# Patient Record
Sex: FEMALE | Race: WHITE | URBAN - METROPOLITAN AREA
[De-identification: names, ages, dates, MRNs, and addresses within clinical notes are randomized per-mention and may not be internally consistent; named-entity substitution may affect disease eponyms.]

---

## 2022-05-19 LAB
BACTERIA: ABNORMAL /HPF
BILIRUBIN URINE: NEGATIVE
BLOOD, URINE: NEGATIVE
CLARITY: CLEAR
COLOR: YELLOW
EPITHELIAL CELLS, UA: ABNORMAL /HPF (ref 0–5)
GLUCOSE URINE: NEGATIVE MG/DL
HYALINE CASTS: ABNORMAL /HPF (ref 0–5)
KETONES, URINE: NEGATIVE MG/DL
LEUKOCYTE ESTERASE, URINE: ABNORMAL
NITRITE, URINE: NEGATIVE
PH UA: 7 (ref 5–9)
PROTEIN UA: 30 MG/DL
RBC UA: ABNORMAL /HPF (ref 0–5)
SPECIFIC GRAVITY UA: 1.02 (ref 1–1.03)
UROBILINOGEN, URINE: 0.2 E.U./DL
WBC UA: ABNORMAL /HPF (ref 0–5)

## 2022-05-20 LAB — URINE CULTURE, ROUTINE: NORMAL

## 2022-07-29 ENCOUNTER — OFFICE VISIT (OUTPATIENT)
Dept: GERIATRIC MEDICINE | Age: 82
End: 2022-07-29
Payer: MEDICARE

## 2022-07-29 DIAGNOSIS — E22.2 SIADH (SYNDROME OF INAPPROPRIATE ADH PRODUCTION) (HCC): ICD-10-CM

## 2022-07-29 DIAGNOSIS — I25.10 CORONARY ARTERY DISEASE INVOLVING NATIVE HEART, UNSPECIFIED VESSEL OR LESION TYPE, UNSPECIFIED WHETHER ANGINA PRESENT: ICD-10-CM

## 2022-07-29 DIAGNOSIS — D32.9 MENINGIOMA (HCC): ICD-10-CM

## 2022-07-29 DIAGNOSIS — K21.9 GASTROESOPHAGEAL REFLUX DISEASE, UNSPECIFIED WHETHER ESOPHAGITIS PRESENT: ICD-10-CM

## 2022-07-29 DIAGNOSIS — J45.909 ASTHMA, UNSPECIFIED ASTHMA SEVERITY, UNSPECIFIED WHETHER COMPLICATED, UNSPECIFIED WHETHER PERSISTENT: ICD-10-CM

## 2022-07-29 DIAGNOSIS — N18.9 CHRONIC KIDNEY DISEASE, UNSPECIFIED CKD STAGE: ICD-10-CM

## 2022-07-29 DIAGNOSIS — F01.50 VASCULAR DEMENTIA WITHOUT BEHAVIORAL DISTURBANCE (HCC): ICD-10-CM

## 2022-07-29 DIAGNOSIS — I10 PRIMARY HYPERTENSION: ICD-10-CM

## 2022-07-29 DIAGNOSIS — R56.9 SEIZURES (HCC): Primary | ICD-10-CM

## 2022-07-29 DIAGNOSIS — E21.3 HYPERPARATHYROIDISM (HCC): ICD-10-CM

## 2022-07-29 DIAGNOSIS — M81.0 OSTEOPOROSIS WITHOUT CURRENT PATHOLOGICAL FRACTURE, UNSPECIFIED OSTEOPOROSIS TYPE: ICD-10-CM

## 2022-07-29 PROCEDURE — 1123F ACP DISCUSS/DSCN MKR DOCD: CPT | Performed by: NURSE PRACTITIONER

## 2022-07-29 PROCEDURE — G8421 BMI NOT CALCULATED: HCPCS | Performed by: NURSE PRACTITIONER

## 2022-07-29 PROCEDURE — 1090F PRES/ABSN URINE INCON ASSESS: CPT | Performed by: NURSE PRACTITIONER

## 2022-08-02 LAB
ALBUMIN SERPL-MCNC: 4.1 G/DL (ref 3.5–4.6)
ALP BLD-CCNC: 116 U/L (ref 40–130)
ALT SERPL-CCNC: 12 U/L (ref 0–33)
ANION GAP SERPL CALCULATED.3IONS-SCNC: 13 MEQ/L (ref 9–15)
AST SERPL-CCNC: 15 U/L (ref 0–35)
BASOPHILS ABSOLUTE: 0 K/UL (ref 0–0.2)
BASOPHILS RELATIVE PERCENT: 0.4 %
BILIRUB SERPL-MCNC: 0.5 MG/DL (ref 0.2–0.7)
BUN BLDV-MCNC: 16 MG/DL (ref 8–23)
CALCIUM SERPL-MCNC: 9.4 MG/DL (ref 8.5–9.9)
CHLORIDE BLD-SCNC: 103 MEQ/L (ref 95–107)
CO2: 25 MEQ/L (ref 20–31)
CREAT SERPL-MCNC: 1.21 MG/DL (ref 0.5–0.9)
EOSINOPHILS ABSOLUTE: 0 K/UL (ref 0–0.7)
EOSINOPHILS RELATIVE PERCENT: 0.8 %
GFR AFRICAN AMERICAN: 51.6
GFR NON-AFRICAN AMERICAN: 42.7
GLOBULIN: 2.8 G/DL (ref 2.3–3.5)
GLUCOSE BLD-MCNC: 128 MG/DL (ref 70–99)
HCT VFR BLD CALC: 32.8 % (ref 37–47)
HEMOGLOBIN: 10.9 G/DL (ref 12–16)
LYMPHOCYTES ABSOLUTE: 1.1 K/UL (ref 1–4.8)
LYMPHOCYTES RELATIVE PERCENT: 20.7 %
MAGNESIUM: 2.1 MG/DL (ref 1.7–2.4)
MCH RBC QN AUTO: 33 PG (ref 27–31.3)
MCHC RBC AUTO-ENTMCNC: 33.3 % (ref 33–37)
MCV RBC AUTO: 99.3 FL (ref 82–100)
MONOCYTES ABSOLUTE: 0.3 K/UL (ref 0.2–0.8)
MONOCYTES RELATIVE PERCENT: 4.8 %
NEUTROPHILS ABSOLUTE: 3.9 K/UL (ref 1.4–6.5)
NEUTROPHILS RELATIVE PERCENT: 73.3 %
PDW BLD-RTO: 13.3 % (ref 11.5–14.5)
PLATELET # BLD: 187 K/UL (ref 130–400)
POTASSIUM SERPL-SCNC: 4.1 MEQ/L (ref 3.4–4.9)
RBC # BLD: 3.3 M/UL (ref 4.2–5.4)
SODIUM BLD-SCNC: 141 MEQ/L (ref 135–144)
TOTAL PROTEIN: 6.9 G/DL (ref 6.3–8)
WBC # BLD: 5.3 K/UL (ref 4.8–10.8)

## 2022-08-04 LAB — VITAMIN D 1,25-DIHYDROXY: 42.1 PG/ML (ref 19.9–79.3)

## 2022-08-13 LAB
BACTERIA: NEGATIVE /HPF
BILIRUBIN URINE: NEGATIVE
BLOOD, URINE: NEGATIVE
CLARITY: CLEAR
COLOR: YELLOW
EPITHELIAL CELLS, UA: ABNORMAL /HPF (ref 0–5)
GLUCOSE URINE: NEGATIVE MG/DL
HYALINE CASTS: ABNORMAL /HPF (ref 0–5)
KETONES, URINE: ABNORMAL MG/DL
LEUKOCYTE ESTERASE, URINE: ABNORMAL
NITRITE, URINE: NEGATIVE
PH UA: 5.5 (ref 5–9)
PROTEIN UA: 30 MG/DL
RBC UA: ABNORMAL /HPF (ref 0–5)
SPECIFIC GRAVITY UA: 1.03 (ref 1–1.03)
UROBILINOGEN, URINE: 1 E.U./DL
WBC UA: >100 /HPF (ref 0–5)

## 2022-08-15 LAB — URINE CULTURE, ROUTINE: NORMAL

## 2022-08-17 LAB
BACTERIA: ABNORMAL /HPF
BILIRUBIN URINE: NEGATIVE
BLOOD, URINE: NEGATIVE
CLARITY: CLEAR
COLOR: YELLOW
GLUCOSE URINE: NEGATIVE MG/DL
HYALINE CASTS: ABNORMAL /LPF (ref 0–5)
KETONES, URINE: ABNORMAL MG/DL
LEUKOCYTE ESTERASE, URINE: ABNORMAL
NITRITE, URINE: NEGATIVE
PH UA: 6 (ref 5–9)
PROTEIN UA: 30 MG/DL
RBC UA: ABNORMAL /HPF (ref 0–2)
SPECIFIC GRAVITY UA: 1.03 (ref 1–1.03)
UROBILINOGEN, URINE: 0.2 E.U./DL
WBC UA: ABNORMAL /HPF (ref 0–5)

## 2022-08-18 LAB — URINE CULTURE, ROUTINE: NORMAL

## 2022-08-24 PROBLEM — I25.10 CORONARY ARTERY DISEASE INVOLVING NATIVE HEART: Status: ACTIVE | Noted: 2022-08-24

## 2022-08-24 PROBLEM — N18.9 CHRONIC KIDNEY DISEASE: Status: ACTIVE | Noted: 2022-08-24

## 2022-08-24 PROBLEM — F01.50 VASCULAR DEMENTIA WITHOUT BEHAVIORAL DISTURBANCE (HCC): Status: ACTIVE | Noted: 2022-08-24

## 2022-08-25 NOTE — PROGRESS NOTES
Nursing Home:  [facility]    The patient is being seen today for follow-up after recent admission to this facility for:  Chief Complaint   Patient presents with    Follow-up     New Admission to our service in [place]         SUBJECTIVE: Being seen today after recent admission to our service in the assisted living at this facility with primary diagnosis of seizures, SIADH, meningioma, hyperparathyroidism, vascular dementia without behavioral disturbance, CKD, primary hypertension, asthma, GERD, osteoporosis without current pathological fracture, and CAD. FAMILY AND SOCIAL HISTORY:  Refer to H&P. Past Medical History:   Diagnosis Date    Asthma     Basal cell carcinoma     C. difficile colitis     E. coli and proteus  UTI 3/18/2016    GERD (gastroesophageal reflux disease)     HTN (hypertension)     Hyperparathyroidism ([facility]     Osteoporosis     Seizures (HCC)     Shingles     SIADH (syndrome of inappropriate ADH production) [facility]      No family history on file. Social History     Socioeconomic History    Marital status:      Spouse name: Not on file    Number of children: Not on file    Years of education: Not on file    Highest education level: Not on file   Occupational History    Not on file   Tobacco Use    Smoking status: Not on file    Smokeless tobacco: Not on file   Substance and Sexual Activity    Alcohol use: Not on file    Drug use: Not on file    Sexual activity: Not on file   Other Topics Concern    Not on file   Social History Narrative    Not on file     Social Determinants of Health     Financial Resource Strain: Not on file   Food Insecurity: Not on file   Transportation Needs: Not on file   Physical Activity: Not on file   Stress: Not on file   Social Connections: Not on file   Intimate Partner Violence: Not on file   Housing Stability: Not on file     ALLERGIES:  Patient has no known allergies.     MEDICATIONS: Butyryl sulfate HFA aerosol solution 108 (90 base MCG/ACT 2 puff inhalation orally every 6 as needed, ASA 81 mg tab p.o. daily, atorvastatin calcium 20 mg tab p.o. daily,Fluticasone-Salmeterol Aerosol Powder Breath Activated 25-50 mcg/act one inhalation orally two times a day, lamotrigine 200mg tablet give one tablet by mouth twice daily, levtiracetam 1000mg tablet give one tablet by mouth daily, levetiracetam 750mg tablet by mouth daily, magnesium oxide 400mg tablet give one tablet p.o. daily, midodrine 2.5mg tablet p.o two times a day, montelukast sodium 10mg tablet give one tablet by mouth daily,mucinex ER 600mg tablet p.o. twice a day. REVIEW OF SYSTEMS:  Resident denies any headache, dizziness, blurred vision. She denies any fever, chills, sore throat. She denies any rashes, bruises, or open areas. She denies any chest pain, chest discomfort, or heart palpitations. She denies any shortness of breath or cough. She denies any nausea, vomiting, or abdominal pain. She denies any urinary urgency, frequency, or dysuria. She denies any constipation or diarrhea. She states she is eating okay, sleeping okay, and she currently has no pain. She is requesting mucinex for occasional congestion. PHYSICAL EXAMINATION:  Most recent vital signs: Blood pressure 124/68, temp 97.5, heart rate 75, respirations 20, pulse ox 98% room air, weight 133.9 pounds. Constitutional:  Resident is a 81 y.o. female alert, pleasantly confused, and cooperative with exam.  In no apparent distress. Integument:  Pink, warm, dry. No visible rashes, bruises, or open areas. HEENT:  Normocephalic, atraumatic. External ears appear to be within normal limits. Conjunctivae pink. Sclerae nonicteric. Mucous membranes pink, moist.  No oropharyngeal exudate. Neck:  Supple. No cervical or clavicular lymphadenopathy. No masses noted. Cardiovascular:  Heart rate regular rate and rhythm. No murmurs, gallops, rubs noted. Respiratory:  Lung sounds Normal.  Respirations nonlabored.   The patient is not using accessory muscles with breathing. Current pulse ox 98% room air. Abdomen:  Soft, nontender, nondistended, normoactive bowel sounds. No masses noted. Musculoskeletal: No muscle tenderness. No joint tenderness. PV:  Peripheral pulses present. She  does not have any edema to BLE. Psychological: Mood stable. Affect pleasantly confused. Speech normal rate and tone. ASSESSMENT AND PLAN:      Diagnosis Orders   1. Seizures ([facility]        2. SIADH (syndrome of inappropriate ADH production) ([facility]        3. Meningioma ([address]        4. Hyperparathyroidism ([facility] [facility]        5. Vascular dementia without behavioral disturbance ([address]        6. Chronic kidney disease, unspecified CKD stage        7. Primary hypertension        8. Asthma, unspecified asthma severity, unspecified whether complicated, unspecified whether persistent        9. Gastroesophageal reflux disease, unspecified whether esophagitis present        10. Osteoporosis without current pathological fracture, unspecified osteoporosis type        11. Coronary artery disease involving native heart, unspecified vessel or lesion type, unspecified whether angina present             She has not had any seizure activity. Continue current antiepileptic drug regimen. Most recent sodium level 140. Denies N/V,cramps, tremors, increased confusion, mood changes or depression. Patient had resected 3/17/2016. No serum PTH found in computer system. Most recent calcium 9.4. Will order serum PTH during next visit. Patient pleasantly confused. DON reports patient may [facility]. Most recent BUN 16, creatinine 1. 19. CMP in am.  BP stable. Patient has no blood pressure medications. Currently on Midodrine twice a day for pressure support. Respiratory status stable. Will add mucinex 600mg ER tablet BID for patient reports of occasional congestion. Tolerating prescribed diet.   No current complaints of pain.  No chest pain, chest discomfort or heart palpitations that patient is aware of. Continue ASA and MagOx as ordered. I have reviewed this resident's medication and treatment plan as well as most recent lab work. CMP,Mg+ level, CBC with diff, Vitamin D level. Mucinex 600mg po q12h. PT/OT/ST as needed. No further changes will be made at this time. Return in about 3 months (around 10/29/2022), or if symptoms worsen or fail to improve, for chronic conditions. Please note this report is partially produced by using speech recognition hardware. It may contain errors related to the system, including grammar, punctuation and spelling as well as words and phrases that may seem inaccurate. For any questions or concerns feel free to contact me for clarification        Electronically Signed By: Daksha Jacob. Andi MUELLER, MARYAN on 8/24/22       ________________________    Daksha Jacob.  Joanna MUELLER, [address]  Office [phone]  Fax [phone]

## 2022-09-05 LAB
ANION GAP SERPL CALCULATED.3IONS-SCNC: 9 MEQ/L (ref 9–15)
BUN BLDV-MCNC: 20 MG/DL (ref 8–23)
CALCIUM SERPL-MCNC: 9.1 MG/DL (ref 8.5–9.9)
CHLORIDE BLD-SCNC: 106 MEQ/L (ref 95–107)
CO2: 25 MEQ/L (ref 20–31)
CREAT SERPL-MCNC: 1.3 MG/DL (ref 0.5–0.9)
GFR AFRICAN AMERICAN: 47.5
GFR NON-AFRICAN AMERICAN: 39.3
GLUCOSE BLD-MCNC: 97 MG/DL (ref 70–99)
HCT VFR BLD CALC: 28.1 % (ref 37–47)
HEMOGLOBIN: 9.9 G/DL (ref 12–16)
MCH RBC QN AUTO: 34.5 PG (ref 27–31.3)
MCHC RBC AUTO-ENTMCNC: 35.2 % (ref 33–37)
MCV RBC AUTO: 98 FL (ref 82–100)
PDW BLD-RTO: 13 % (ref 11.5–14.5)
PLATELET # BLD: 185 K/UL (ref 130–400)
POTASSIUM SERPL-SCNC: 4.5 MEQ/L (ref 3.4–4.9)
RBC # BLD: 2.87 M/UL (ref 4.2–5.4)
SODIUM BLD-SCNC: 140 MEQ/L (ref 135–144)
WBC # BLD: 5.1 K/UL (ref 4.8–10.8)

## 2022-09-08 PROBLEM — I95.9 HYPOTENSION: Status: ACTIVE | Noted: 2022-09-08

## 2022-09-12 RX ORDER — ATORVASTATIN CALCIUM 20 MG/1
20 TABLET, FILM COATED ORAL DAILY
Qty: 30 TABLET | Refills: 3 | Status: SHIPPED | OUTPATIENT
Start: 2022-09-12

## 2022-09-12 RX ORDER — ATORVASTATIN CALCIUM 20 MG/1
20 TABLET, FILM COATED ORAL DAILY
COMMUNITY
End: 2022-09-12 | Stop reason: SDUPTHER

## 2022-09-12 RX ORDER — FLUTICASONE PROPIONATE AND SALMETEROL 250; 50 UG/1; UG/1
1 POWDER RESPIRATORY (INHALATION) 2 TIMES DAILY
Qty: 60 EACH | Refills: 3 | Status: SHIPPED | OUTPATIENT
Start: 2022-09-12

## 2022-09-12 RX ORDER — FLUTICASONE PROPIONATE AND SALMETEROL 250; 50 UG/1; UG/1
1 POWDER RESPIRATORY (INHALATION) 2 TIMES DAILY
COMMUNITY
End: 2022-09-12 | Stop reason: SDUPTHER

## 2022-09-27 ENCOUNTER — OFFICE VISIT (OUTPATIENT)
Dept: GERIATRIC MEDICINE | Age: 82
End: 2022-09-27
Payer: MEDICARE
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
## 2022-09-27 DIAGNOSIS — D32.9 MENINGIOMA (HCC): ICD-10-CM

## 2022-09-27 DIAGNOSIS — N18.9 CHRONIC KIDNEY DISEASE, UNSPECIFIED CKD STAGE: ICD-10-CM

## 2022-09-27 DIAGNOSIS — F01.50 VASCULAR DEMENTIA WITHOUT BEHAVIORAL DISTURBANCE (HCC): ICD-10-CM

## 2022-09-27 DIAGNOSIS — I25.10 CORONARY ARTERY DISEASE INVOLVING NATIVE HEART, UNSPECIFIED VESSEL OR LESION TYPE, UNSPECIFIED WHETHER ANGINA PRESENT: ICD-10-CM

## 2022-09-27 DIAGNOSIS — K21.9 GASTROESOPHAGEAL REFLUX DISEASE, UNSPECIFIED WHETHER ESOPHAGITIS PRESENT: ICD-10-CM

## 2022-09-27 DIAGNOSIS — E22.2 SIADH (SYNDROME OF INAPPROPRIATE ADH PRODUCTION) (HCC): ICD-10-CM

## 2022-09-27 DIAGNOSIS — R56.9 SEIZURES (HCC): ICD-10-CM

## 2022-09-27 DIAGNOSIS — E21.3 HYPERPARATHYROIDISM (HCC): Primary | ICD-10-CM

## 2022-09-27 DIAGNOSIS — M81.0 OSTEOPOROSIS WITHOUT CURRENT PATHOLOGICAL FRACTURE, UNSPECIFIED OSTEOPOROSIS TYPE: ICD-10-CM

## 2022-09-27 DIAGNOSIS — J45.909 UNCOMPLICATED ASTHMA, UNSPECIFIED ASTHMA SEVERITY, UNSPECIFIED WHETHER PERSISTENT: ICD-10-CM

## 2022-09-27 DIAGNOSIS — I10 PRIMARY HYPERTENSION: ICD-10-CM

## 2022-09-27 PROCEDURE — 1090F PRES/ABSN URINE INCON ASSESS: CPT | Performed by: NURSE PRACTITIONER

## 2022-09-27 PROCEDURE — 1123F ACP DISCUSS/DSCN MKR DOCD: CPT | Performed by: NURSE PRACTITIONER

## 2022-09-27 PROCEDURE — G8421 BMI NOT CALCULATED: HCPCS | Performed by: NURSE PRACTITIONER

## 2022-10-18 NOTE — PROGRESS NOTES
Nursing Home:  [address] [place] [address]    The patient is being seen today for follow-up after recent admission to this facility for:  Chief Complaint   Patient presents with    Follow-up         SUBJECTIVE:  Patient being seen today for follow-up after recent transfer from standard Assisted Living to the memory care assisted living. Patient reports she is doing well and offers no concerns. She reports she does miss her  who is currently in healthcare but he does come and visit frequently. FAMILY AND SOCIAL HISTORY:  Refer to H&P. Past Medical History:   Diagnosis Date    Asthma     Basal cell carcinoma     C. difficile colitis     E. coli and proteus  UTI 3/18/2016    GERD (gastroesophageal reflux disease)     HTN (hypertension)     Hyperparathyroidism ([facility] [address]     Osteoporosis     Seizures (HCC)     Shingles     SIADH (syndrome of inappropriate ADH production) ([facility]      No family history on file. Social History     Socioeconomic History    Marital status:      Spouse name: Not on file    Number of children: Not on file    Years of education: Not on file    Highest education level: Not on file   Occupational History    Not on file   Tobacco Use    Smoking status: Not on file    Smokeless tobacco: Not on file   Substance and Sexual Activity    Alcohol use: Not on file    Drug use: Not on file    Sexual activity: Not on file   Other Topics Concern    Not on file   Social History Narrative    Not on file     Social Determinants of Health     Financial Resource Strain: Not on file   Food Insecurity: Not on file   Transportation Needs: Not on file   Physical Activity: Not on file   Stress: Not on file   Social Connections: Not on file   Intimate Partner Violence: Not on file   Housing Stability: Not on file     ALLERGIES:  Patient has no known allergies. MEDICATIONS: Albuterol sulfate HFA aerosol solution 108 (90 base MCG/ACT 2 puff inhalation orally every 6 as needed, ASA 81 mg tab p.o. daily, atorvastatin calcium 20 mg tab p.o. daily,fluticasone-salmeterol aerosol powder breath activated 250-50 MCG/ACT one inhalation orally two times a day, lamotrigine 200 mg tab p.o. twice daily, Keppra 1000 mg p.o. daily, Keppra 750 mg p.o. daily, magnesium oxide 400 mg tab p.o. daily, midodrine 5 mg tab p.o. twice daily, montelukast sodium 10 mg tab p.o. daily, Mucinex extended release 12-hour tab 600 mg tab p.o. twice daily. REVIEW OF SYSTEMS:  Resident denies any headache, dizziness, blurred vision. She denies any fever, chills, sore throat. She denies any rashes, bruises, or open areas. She denies any chest pain, chest discomfort, or heart palpitations. She denies any shortness of breath or cough. She denies any nausea, vomiting, or abdominal pain. She denies any urinary urgency, frequency, or dysuria. She denies any constipation or diarrhea. She states she is eating okay, sleeping okay, and she currently has no pain. She reports she is doing well. PHYSICAL EXAMINATION:  Most recent vital signs:  /81, temperature 97.5, heart rate 81, respirations 18, spo2 98%, weight 134lbs. Constitutional:  Resident is a 81 y.o. female alert, pleasant, and cooperative with exam.  In no apparent distress. Integument:  Pink, warm, dry. No visible rashes, bruises or open areas. HEENT:  Normocephalic, atraumatic. External ears appear to be within normal limits. [place]. Conjunctivae pink. Sclerae nonicteric. Mucous membranes pink, moist.  No oropharyngeal exudate. Neck:  Supple. No cervical or clavicular lymphadenopathy. No masses noted. Cardiovascular:  Heart rate regular rate and rhythm. No murmurs, gallops, rubs noted. Respiratory:  Lung sounds Normal.  Respirations nonlabored. The patient is not using accessory muscles with breathing. Current pulse ox 98% on room air. Abdomen:  Soft, nontender, nondistended, normoactive bowel sounds. No masses noted.   Musculoskeletal: No muscle tenderness. No joint tenderness. PV:  Peripheral pulses present. She  does not have any edema to BLE. Psychological: Mood stable. Affect pleasantly confused. Speech normal rate and tone. ASSESSMENT AND PLAN:      Diagnosis Orders   1. Hyperparathyroidism ([address]        2. SIADH (syndrome of inappropriate ADH production) ([facility])        3. Vascular dementia without behavioral disturbance ([address]        4. Meningioma ([address]        5. Seizures ([address]        6. Primary hypertension        7. Coronary artery disease involving native heart, unspecified vessel or lesion type, unspecified whether angina present        8. Uncomplicated asthma, unspecified asthma severity, unspecified whether persistent        9. Gastroesophageal reflux disease, unspecified whether esophagitis present        10. Osteoporosis without current pathological fracture, unspecified osteoporosis type        11. Chronic kidney disease, unspecified CKD stage        12. Fall, initial encounter             Patient is currently asymptomatic. Will do serum PTH during next visit. Most recent sodium level 140. Patients sodium level has been stable since 2016 in review of records in [address] [place] [address] Rd. Mood stable. Patient resides on the memory care unit of this facility where she derives benefit. Follows with neurology at [facility]. No recent seizure activity. Continue lamotrigine and keppra as ordered. Blood pressure stable. She does take Midodrine for episodes of hypotension. No episodes of chest pain/chest discomfort. Continue ASA and Magnesium as ordered. No recent asthma attacks. Continue breathing treatments. Tolerating prescribed diet. No complaints of pain. Most recent BUN 20, creatinine 1.30      I have reviewed this resident's medication and treatment plan as well as most recent lab work. PT/OT and ST as needed. No further changes will be made at this time.       Return in about 3 months (around 12/27/2022), or if symptoms worsen or fail to improve, for chronic conditions. Please note this report is partially produced by using speech recognition hardware. It may contain errors related to the system, including grammar, punctuation and spelling as well as words and phrases that may seem inaccurate. For any questions or concerns feel free to contact me for clarification        Electronically Signed By: Gianluca Isbell. Andi MUELLER CNP on 10/17/22   ______________________________  Gianluca Isbell.  Andi MUELLER, CNP

## 2022-11-29 ENCOUNTER — OFFICE VISIT (OUTPATIENT)
Dept: GERIATRIC MEDICINE | Age: 82
End: 2022-11-29
Payer: MEDICARE

## 2022-11-29 DIAGNOSIS — U07.1 COVID: Primary | ICD-10-CM

## 2022-11-29 PROCEDURE — G8484 FLU IMMUNIZE NO ADMIN: HCPCS | Performed by: NURSE PRACTITIONER

## 2022-11-29 PROCEDURE — G8421 BMI NOT CALCULATED: HCPCS | Performed by: NURSE PRACTITIONER

## 2022-11-29 PROCEDURE — 1123F ACP DISCUSS/DSCN MKR DOCD: CPT | Performed by: NURSE PRACTITIONER

## 2022-11-29 PROCEDURE — 1090F PRES/ABSN URINE INCON ASSESS: CPT | Performed by: NURSE PRACTITIONER

## 2022-12-01 LAB
ANION GAP SERPL CALCULATED.3IONS-SCNC: 8 MEQ/L (ref 9–15)
BASOPHILS ABSOLUTE: 0 K/UL (ref 0–0.2)
BASOPHILS RELATIVE PERCENT: 0.4 %
BUN BLDV-MCNC: 16 MG/DL (ref 8–23)
CALCIUM SERPL-MCNC: 9.3 MG/DL (ref 8.5–9.9)
CHLORIDE BLD-SCNC: 102 MEQ/L (ref 95–107)
CO2: 29 MEQ/L (ref 20–31)
CREAT SERPL-MCNC: 1.26 MG/DL (ref 0.5–0.9)
EOSINOPHILS ABSOLUTE: 0 K/UL (ref 0–0.7)
EOSINOPHILS RELATIVE PERCENT: 0.9 %
GFR SERPL CREATININE-BSD FRML MDRD: 42.7 ML/MIN/{1.73_M2}
GLUCOSE BLD-MCNC: 101 MG/DL (ref 70–99)
HCT VFR BLD CALC: 30.8 % (ref 37–47)
HEMOGLOBIN: 10.2 G/DL (ref 12–16)
LYMPHOCYTES ABSOLUTE: 1 K/UL (ref 1–4.8)
LYMPHOCYTES RELATIVE PERCENT: 22.8 %
MCH RBC QN AUTO: 33.1 PG (ref 27–31.3)
MCHC RBC AUTO-ENTMCNC: 33.3 % (ref 33–37)
MCV RBC AUTO: 99.5 FL (ref 79.4–94.8)
MONOCYTES ABSOLUTE: 0.3 K/UL (ref 0.2–0.8)
MONOCYTES RELATIVE PERCENT: 7 %
NEUTROPHILS ABSOLUTE: 2.9 K/UL (ref 1.4–6.5)
NEUTROPHILS RELATIVE PERCENT: 68.9 %
PDW BLD-RTO: 12.4 % (ref 11.5–14.5)
PLATELET # BLD: 211 K/UL (ref 130–400)
POTASSIUM SERPL-SCNC: 3.8 MEQ/L (ref 3.4–4.9)
RBC # BLD: 3.09 M/UL (ref 4.2–5.4)
SODIUM BLD-SCNC: 139 MEQ/L (ref 135–144)
WBC # BLD: 4.2 K/UL (ref 4.8–10.8)

## 2022-12-01 RX ORDER — NIRMATRELVIR AND RITONAVIR 150-100 MG
KIT ORAL
Qty: 20 TABLET | Refills: 0 | Status: SHIPPED | OUTPATIENT
Start: 2022-12-01 | End: 2022-12-06

## 2022-12-20 ENCOUNTER — NURSE ONLY (OUTPATIENT)
Dept: GERIATRIC MEDICINE | Age: 82
End: 2022-12-20
Payer: MEDICARE

## 2022-12-20 DIAGNOSIS — F01.50 VASCULAR DEMENTIA WITHOUT BEHAVIORAL DISTURBANCE (HCC): ICD-10-CM

## 2022-12-20 DIAGNOSIS — Z74.09 IMPAIRED MOBILITY AND ACTIVITIES OF DAILY LIVING: Primary | ICD-10-CM

## 2022-12-20 DIAGNOSIS — R56.9 SEIZURES (HCC): ICD-10-CM

## 2022-12-20 DIAGNOSIS — Z78.9 IMPAIRED MOBILITY AND ACTIVITIES OF DAILY LIVING: Primary | ICD-10-CM

## 2022-12-20 PROBLEM — U07.1 COVID: Status: ACTIVE | Noted: 2022-12-20

## 2022-12-20 PROCEDURE — G9692 HOSP RECD BY PT DUR MSMT PER: HCPCS | Performed by: INTERNAL MEDICINE

## 2022-12-20 NOTE — PROGRESS NOTES
Subjective: [facility]  CC: COVID-19    HPI:  Fabiola Fuentes is a 81 y.o. female was seen today for COVID 19 evaluation. Patient has been COVID 19 positive since 11/29/22. They were seen with full PPE and observing continued droplet precautions. Condition discussed with nursing staff and treatment reviewed. Recent bloodwork, chest x-ray and vital signs reviewed. Fever curve and oxygen demands reviewed. Nutritional status and intake reviewed. Patient is demonstrating increased respiratory complaints at this time. Patient has not been febrile in the last 24 hours. Patient is able to feed themselves. Patient is not demonstrating increased oxygen demand. Past Medical History:   Diagnosis Date    Asthma     Basal cell carcinoma     C. difficile colitis     E. coli and proteus  UTI 3/18/2016    GERD (gastroesophageal reflux disease)     HTN (hypertension)     Hyperparathyroidism ([facility]     Osteoporosis     Seizures (HCC)     Shingles     SIADH (syndrome of inappropriate ADH production) ([facility] [facility]      No past surgical history on file. No family history on file.   Social History     Socioeconomic History    Marital status:      Spouse name: Not on file    Number of children: Not on file    Years of education: Not on file    Highest education level: Not on file   Occupational History    Not on file   Tobacco Use    Smoking status: Not on file    Smokeless tobacco: Not on file   Substance and Sexual Activity    Alcohol use: Not on file    Drug use: Not on file    Sexual activity: Not on file   Other Topics Concern    Not on file   Social History Narrative    Not on file     Social Determinants of Health     Financial Resource Strain: Not on file   Food Insecurity: Not on file   Transportation Needs: Not on file   Physical Activity: Not on file   Stress: Not on file   Social Connections: Not on file   Intimate Partner Violence: Not on file   Housing Stability: Not on file     Current Outpatient Medications on File Prior to Visit   Medication Sig Dispense Refill    atorvastatin (LIPITOR) 20 MG tablet Take 1 tablet by mouth daily 30 tablet 3    fluticasone-salmeterol (ADVAIR) 250-50 MCG/ACT AEPB diskus inhaler Inhale 1 puff into the lungs 2 times daily 60 each 3     No current facility-administered medications on file prior to visit. Review of Systems  Denies headache, fever, chills, dizziness blurred vision, or sore throat. Reports cough and chest congestion. Denies chest pain, or shortness of breath. Denies abdominal pain, nausea, vomiting, loss of appetite. Denies constipation or diarrhea. Denies muscle aches or pains,difficulty sleeping. Denies urinary frequency, urgency, or dysuria. Objective:     Physical Exam   Most recent VS: /63, temperature 97.7, heart rate 80, respirations 18, spo2 96%, weight 137lbs  Constitutional: Alert, pleasantly confused, elderly female in no apparent distress. Sitting in recliner at time of my visit. Pleasant and cooperative with exam.  HEENT: Normcephalic, atraumatic, conjunctiva pink, sclera nonicteric, external ears appear within normal limits, [place], MMM, pink, no oropharyngeal exudate. NECK: No cervical or clavicular lymphadenopathy. No masses noted. CARDIAC: RRR, No MGR noted. RESPIRATORY: LSCTA but diminished throughout, respirations even and nonlabored. No use of accessory muscles with breathing. No cough noted during exam. Current spo2 96% on room air. ABDOMEN: Soft NT, ND BS x4 present. No masses noted. : No suprapubic tenderness  PSYCH:Alert, pleasantly confused. Speech normal rate and tone. INTEG: Pink, warm and dry. No visible bruises, rashes or open areas. PERIPHERAL VASCULAR: No edema noted. PPP yasmni. MARK hoses, knee high.     .lastLDH  No results found for: FERRITIN    Lab Results   Component Value Date    CKTOTAL 12 03/23/2016    CKMB <0.1 03/23/2016    CKMBINDEX 0.8 03/23/2016    TROPONINI <0.010 03/23/2016     No results found for: CRP   Lab Results   Component Value Date    DDIMER 3.05 (H) 03/22/2016      Lab Results   Component Value Date    CKTOTAL 12 03/23/2016       Please refer to on site chart for most recent chest Xray result, reviewed at this time. Assessment & Plan:     Continue with current regimen of  Vitamin C 500mg PO BID  Vitamin D 1000 IU PO QD  Zinc 50 mg PO QD  Lovenox 30mg SQ BID    Patient does not require Dexamethasone 6mg PO QD  Patient does not  require antibiotics for concomitant bacterial pneumonia. Continue to monitor blood work:  Weekly CBC, BMP. CXR AP & lat in am    Return in about 3 months (around 2/28/2023), or if symptoms worsen or fail to improve, for chronic conditions. Patient's case discussed with nursing staff and Code Status reviewed. Please note orders entered on site at facility after discussion with appropriate facility nursing/therapy/ / nutritional staff. Current longstanding medical problems and acute medical issues addressed with staff. Available data and data elements in on site paper chart reviewed and analyzed. Current external consultant notes reviewed in on site chart. Ordered laboratory testing and imaging will be reviewed when available. Please note this report is partially produced by using speech recognition hardware. It may contain errors related to the system, including grammar, punctuation and spelling as well as words and phrases that may seem inaccurate.   For any questions or concerns feel free to contact me for clarification       ERVIN Kathleen - CNP

## 2023-01-08 ENCOUNTER — OFFICE VISIT (OUTPATIENT)
Dept: GERIATRIC MEDICINE | Age: 83
End: 2023-01-08
Payer: MEDICARE

## 2023-01-08 DIAGNOSIS — N18.30 STAGE 3 CHRONIC KIDNEY DISEASE, UNSPECIFIED WHETHER STAGE 3A OR 3B CKD (HCC): ICD-10-CM

## 2023-01-08 DIAGNOSIS — F41.9 ANXIETY: ICD-10-CM

## 2023-01-08 DIAGNOSIS — F32.A DEPRESSION, UNSPECIFIED DEPRESSION TYPE: ICD-10-CM

## 2023-01-08 DIAGNOSIS — F03.918 DEMENTIA WITH BEHAVIORAL DISTURBANCE: Primary | ICD-10-CM

## 2023-01-08 DIAGNOSIS — I10 HYPERTENSION, UNSPECIFIED TYPE: ICD-10-CM

## 2023-01-08 DIAGNOSIS — G40.909 NONINTRACTABLE EPILEPSY WITHOUT STATUS EPILEPTICUS, UNSPECIFIED EPILEPSY TYPE (HCC): ICD-10-CM

## 2023-01-08 PROCEDURE — 99306 1ST NF CARE HIGH MDM 50: CPT | Performed by: INTERNAL MEDICINE

## 2023-01-08 PROCEDURE — G8484 FLU IMMUNIZE NO ADMIN: HCPCS | Performed by: INTERNAL MEDICINE

## 2023-01-08 PROCEDURE — 1123F ACP DISCUSS/DSCN MKR DOCD: CPT | Performed by: INTERNAL MEDICINE

## 2023-01-09 PROBLEM — Z51.5 HOSPICE CARE: Status: ACTIVE | Noted: 2023-01-09

## 2023-01-09 NOTE — PROGRESS NOTES
History and Physical      CHIEF COMPLAINT:  dementia     History of Present Illness:      A 81 y.o. female who is being seen at [facility]. She is currently under hospice care. She was very anxious and tearful this morning. She is sitting in the [place] area and wheelchair. She refused to take medications this morning as she thought they were trying to kill her    REVIEW OF SYSTEMS:  A complete 10 Point review of systems was preformed and negative unless previously stated      PMH:  Past Medical History:   Diagnosis Date    Asthma     Basal cell carcinoma     C. difficile colitis     E. coli and proteus  UTI 3/18/2016    GERD (gastroesophageal reflux disease)     HTN (hypertension)     Hyperparathyroidism ([facility]     Osteoporosis     Seizures (HCC)     Shingles     SIADH (syndrome of inappropriate ADH production) ([facility]    CKD stage 3     Surgical History:  None    Medications Prior to Admission:    Prior to Admission medications    Medication Sig Start Date End Date Taking? Authorizing Provider   atorvastatin (LIPITOR) 20 MG tablet Take 1 tablet by mouth daily 9/12/22   Tita Ramos MD   fluticasone-salmeterol (ADVAIR) 250-50 MCG/ACT AEPB diskus inhaler Inhale 1 puff into the lungs 2 times daily 9/12/22   Tita Ramos MD       Allergies:    Patient has no known allergies. Social History:     Denies smoking and drugs or alcohol. Family History:   Cardiac disease    PHYSICAL EXAM:      Vitals were reviewed and stable     Gen- appears stated age. Sitting in the main area  HENT- Head atraumtic, [place], oral mucosa moist. Original dentition and fair. Eye- EOMI, No pale conjunctiva. No scleral icterus   Neck- No thyromegalgy. No JVD. Heart- RRR no murmur No LE edema  Lungs- CTA b/l no respiratory distress. RA oxygen   Abd- soft, Nontender, BS x 4   Musculoskel- muscle strength 5/5 UE bilaterally. 4/5 lower extremity bilaterally. Neuro- grossly intact. No acute deficits noted.  No sensation disturbances. No facial droop   Skin- intact and dry. No rashes or ulcerations  Psych-  Mood and affect anxious. Alert and oriented x 1 (person)         LABS:  Lab Results   Component Value Date/Time     01/06/2023 05:57 AM    K 4.3 01/06/2023 05:57 AM     01/06/2023 05:57 AM    CO2 29 12/01/2022 09:20 AM    BUN 16 12/01/2022 09:20 AM    CREATININE 1.20 01/06/2023 05:57 AM    GLUCOSE 109 01/06/2023 05:57 AM    CALCIUM 9.5 01/06/2023 05:57 AM      Lab Results   Component Value Date    WBC 8.8 01/06/2023    HGB 11.6 (L) 01/06/2023    HCT 36.1 01/06/2023     (H) 01/06/2023     01/06/2023             ASSESSMENT/ PLAN[de-identified]      Dementia with behavioral disturbances  Refusing meds thinking staff poisoning her. HTN  Epilepsy  CKD stage 3   Depression/ anxiety  Ativan written. Patient is hospice patient. Code status was clarified as DNR CC. FREDY Devries DO         NOTE: This report was transcribed using voice recognition software. Every effort was made to ensure accuracy; however, inadvertent computerized transcription errors may be present.

## 2023-01-10 ENCOUNTER — OFFICE VISIT (OUTPATIENT)
Dept: GERIATRIC MEDICINE | Age: 83
End: 2023-01-10
Payer: MEDICARE

## 2023-01-10 DIAGNOSIS — I95.9 HYPOTENSION, UNSPECIFIED HYPOTENSION TYPE: ICD-10-CM

## 2023-01-10 DIAGNOSIS — J45.909 UNCOMPLICATED ASTHMA, UNSPECIFIED ASTHMA SEVERITY, UNSPECIFIED WHETHER PERSISTENT: ICD-10-CM

## 2023-01-10 DIAGNOSIS — N18.9 CHRONIC KIDNEY DISEASE, UNSPECIFIED CKD STAGE: ICD-10-CM

## 2023-01-10 DIAGNOSIS — R41.82 ALTERED MENTAL STATUS, UNSPECIFIED ALTERED MENTAL STATUS TYPE: ICD-10-CM

## 2023-01-10 DIAGNOSIS — F01.50 VASCULAR DEMENTIA WITHOUT BEHAVIORAL DISTURBANCE (HCC): ICD-10-CM

## 2023-01-10 DIAGNOSIS — D32.9 MENINGIOMA (HCC): Primary | ICD-10-CM

## 2023-01-10 DIAGNOSIS — R56.9 SEIZURES (HCC): ICD-10-CM

## 2023-01-10 PROCEDURE — G9692 HOSP RECD BY PT DUR MSMT PER: HCPCS | Performed by: NURSE PRACTITIONER

## 2023-01-10 PROCEDURE — 99309 SBSQ NF CARE MODERATE MDM 30: CPT | Performed by: NURSE PRACTITIONER

## 2023-01-10 PROCEDURE — G8484 FLU IMMUNIZE NO ADMIN: HCPCS | Performed by: NURSE PRACTITIONER

## 2023-01-11 DIAGNOSIS — G40.909 SEIZURE DISORDER (HCC): Primary | ICD-10-CM

## 2023-01-11 RX ORDER — LACOSAMIDE 200 MG/1
200 TABLET ORAL 2 TIMES DAILY
Qty: 60 TABLET | Refills: 0 | Status: SHIPPED | OUTPATIENT
Start: 2023-01-11 | End: 2023-02-10

## 2023-01-12 RX ORDER — ACETAMINOPHEN 325 MG/1
975 TABLET ORAL 3 TIMES DAILY
COMMUNITY

## 2023-01-12 RX ORDER — ASPIRIN 81 MG/1
81 TABLET, CHEWABLE ORAL DAILY
COMMUNITY

## 2023-01-12 RX ORDER — LAMOTRIGINE 100 MG/1
100 TABLET ORAL 2 TIMES DAILY
COMMUNITY

## 2023-01-12 RX ORDER — LEVETIRACETAM 1000 MG/1
1000 TABLET ORAL DAILY
COMMUNITY

## 2023-01-12 RX ORDER — FLUTICASONE FUROATE AND VILANTEROL 100; 25 UG/1; UG/1
1 POWDER RESPIRATORY (INHALATION) DAILY
COMMUNITY

## 2023-01-12 RX ORDER — MONTELUKAST SODIUM 10 MG/1
10 TABLET ORAL NIGHTLY
COMMUNITY

## 2023-01-12 RX ORDER — OMEPRAZOLE 20 MG/1
20 CAPSULE, DELAYED RELEASE ORAL DAILY
COMMUNITY

## 2023-01-12 RX ORDER — LORAZEPAM 1 MG/1
1 TABLET ORAL EVERY 6 HOURS PRN
COMMUNITY

## 2023-01-12 RX ORDER — LEVETIRACETAM 500 MG/1
1250 TABLET ORAL NIGHTLY
COMMUNITY

## 2023-01-12 NOTE — PROGRESS NOTES
SUBJECTIVE:  56-year-old woman seen follow-up visit for her functional decline weakness. Dementia. Patient has not new acute psychosis. Patient has a history of seizure activity with evidence seizure at this time. Has been on antiepileptic agents. ROS: Limited by cognition  The rest of the 14 point ROS negative    PHYSICAL EXAM: VSS per facility record  To SLP for poor visual acuity oral mucosas dry chest showed no crackles no wheezing cardiovascular showed a regular rate abdomen soft positive bowel sounds no pulsatile mass noted extremity trace edema    ASSESSMENT & PLAN:   Diagnosis Orders   1. Impaired mobility and activities of daily living secondary to Encephalopathy seconday to s/p resection of meningioma with post radiation necrosis. [facility]-03/17/16        2. Seizures ([facility] [address]        3. Vascular dementia without behavioral disturbance (HCC)          Antiepileptic regimen. Reorientation as able. No acute psychosis. Ongoing concern for falls            Past Medical History:   Diagnosis Date    Asthma     Basal cell carcinoma     C. difficile colitis     E. coli and proteus  UTI 3/18/2016    GERD (gastroesophageal reflux disease)     HTN (hypertension)     Hyperparathyroidism (HCC)     Osteoporosis     Seizures (HCC)     Shingles     SIADH (syndrome of inappropriate ADH production) ([facility] [address]          No past surgical history on file.       Current Outpatient Medications on File Prior to Visit   Medication Sig Dispense Refill    atorvastatin (LIPITOR) 20 MG tablet Take 1 tablet by mouth daily (Patient taking differently: Take 40 mg by mouth daily Indications: High Amount of Fats in the Blood) 30 tablet 3    fluticasone-salmeterol (ADVAIR) 250-50 MCG/ACT AEPB diskus inhaler Inhale 1 puff into the lungs 2 times daily (Patient taking differently: Inhale 2 puffs into the lungs 2 times daily Indications: Chronic Obstructive Lung Disease) 60 each 3     No current facility-administered medications on file prior to visit. No family history on file.     Social History     Socioeconomic History    Marital status:      Spouse name: Not on file    Number of children: Not on file    Years of education: Not on file    Highest education level: Not on file   Occupational History    Not on file   Tobacco Use    Smoking status: Not on file    Smokeless tobacco: Not on file   Substance and Sexual Activity    Alcohol use: Not on file    Drug use: Not on file    Sexual activity: Not on file   Other Topics Concern    Not on file   Social History Narrative    Not on file     Social Determinants of Health     Financial Resource Strain: Not on file   Food Insecurity: Not on file   Transportation Needs: Not on file   Physical Activity: Not on file   Stress: Not on file   Social Connections: Not on file   Intimate Partner Violence: Not on file   Housing Stability: Not on file         Lab Results   Component Value Date    LABA1C 5.7 (A) 12/31/2022     Lab Results   Component Value Date     12/31/2022       Lab Results   Component Value Date/Time     01/06/2023 05:57 AM    K 4.3 01/06/2023 05:57 AM     01/06/2023 05:57 AM    CO2 29 12/01/2022 09:20 AM    BUN 16 12/01/2022 09:20 AM    CREATININE 1.20 01/06/2023 05:57 AM    GLUCOSE 109 01/06/2023 05:57 AM    CALCIUM 9.5 01/06/2023 05:57 AM        Lab Results   Component Value Date    CHOL 119 01/01/2023     Lab Results   Component Value Date    TRIG 177 (H) 01/01/2023     Lab Results   Component Value Date    HDL 31.2 (A) 01/01/2023     Lab Results   Component Value Date    LDLCHOLESTEROL 52 01/01/2023     Lab Results   Component Value Date    LABVLDL 35 01/01/2023     Lab Results   Component Value Date    CHOLHDLRATIO 3.8 01/01/2023       Lab Results   Component Value Date    TSH 0.82 12/31/2022       Lab Results   Component Value Date    WBC 8.8 01/06/2023    HGB 11.6 (L) 01/06/2023    HCT 36.1 01/06/2023     (H) 01/06/2023     01/06/2023 Please note orders entered on site at facility after discussion with appropriate facility nursing/therapy/ / nutritional staff. Current longstanding medical problems and acute medical issues addressed with staff. Available data and data elements in on site paper chart reviewed and analyzed. Current external consultant notes reviewed in on site chart. Ordered laboratory testing and imaging will be reviewed when available.

## 2023-01-29 NOTE — PROGRESS NOTES
Nursing Home:  [address] [place] [address]    The patient is being seen today for follow-up after recent admission to this facility for:  Chief Complaint   Patient presents with    Follow-Up from Hospital         SUBJECTIVE: Patient being seen today for follow-up after recent admission to this facility from the hospital with primary diagnosis of meningioma, altered mental status, seizures, hypotension, vascular dementia without behavioral disturbance, CKD, and uncomplicated asthma. FAMILY AND SOCIAL HISTORY:  Refer to H&P. Past Medical History:   Diagnosis Date    Asthma     Basal cell carcinoma     C. difficile colitis     E. coli and proteus  UTI 3/18/2016    GERD (gastroesophageal reflux disease)     HTN (hypertension)     Hyperparathyroidism ([facility] [address]     Osteoporosis     Seizures (HCC)     Shingles     SIADH (syndrome of inappropriate ADH production) ([facility] [address]      No family history on file. Social History     Socioeconomic History    Marital status:      Spouse name: Not on file    Number of children: Not on file    Years of education: Not on file    Highest education level: Not on file   Occupational History    Not on file   Tobacco Use    Smoking status: Not on file    Smokeless tobacco: Not on file   Substance and Sexual Activity    Alcohol use: Not on file    Drug use: Not on file    Sexual activity: Not on file   Other Topics Concern    Not on file   Social History Narrative    Not on file     Social Determinants of Health     Financial Resource Strain: Not on file   Food Insecurity: Not on file   Transportation Needs: Not on file   Physical Activity: Not on file   Stress: Not on file   Social Connections: Not on file   Intimate Partner Violence: Not on file   Housing Stability: Not on file     ALLERGIES:  Patient has no known allergies.     MEDICATIONS: Acetaminophen 325 mg tab give 3 tablets p.o. 3 times daily, Advair Diskus aerosol Powder breath activated 250-50 MCG/ACT 2 puff inhalation orally twice daily, albuterol sulfate HFA aerosol solution 108 (90 base MCG/ACT 2 puff inhalation orally every 6 hours as needed, albuterol sulfate nebulization solution 2.5 mg per 3 mL 0.083% 1 unit inhalation orally 4 times daily, ASA 81 mg chewable tablet p.o. daily, Ativan 1 mg tab p.o. every 6 hours as needed, Breo Ellipta aerosol Powder breath activated 100-25 MCG/INH 1 puff inhalation orally daily, Budesonide suspension 0.5 mg per 2 mL 1 unit inhalation orally twice daily Dulcolax suppository 10 mg rectal suppository as needed if no relief from milk of mag, Keppra tablet 1000 mg p.o. daily, Keppra 500 mg give 2-1/2 tablets p.o. daily, lacosamide 200 mg tab p.o. twice daily, Lamictal 100 mg tab p.o. twice daily, Lipitor 40 mg tab give 1 tab p.o. daily, milk of magnesia suspension 1200 mg per 15 mL give 30 mL p.o. as needed, montelukast sodium tab 10 mg tab p.o. daily, omeprazole capsule 20 mg delayed release capsule p.o. daily, Valium 10 mg tab sublingual q. 10 minutes as needed x3 doses may repeat every 30 minutes for active seizures per hospice. REVIEW OF SYSTEMS:  Resident denies any headache, dizziness, blurred vision. She denies any fever, chills, sore throat. She denies any rashes, bruises, or open areas. She denies any chest pain, chest discomfort, or heart palpitations. She denies any shortness of breath or cough. She denies any nausea, vomiting, or abdominal pain. She denies any urinary urgency, frequency, or dysuria. She denies any constipation or diarrhea. She states she is eating okay, sleeping okay, and she currently has no pain. PHYSICAL EXAMINATION:  Most recent vital signs:  /75, temperature 97.6, heart rate 75, respirations 18, spo2 98% on room air, weight 213lbs. Constitutional:  Resident is a 81 y.o. female alert, pleasantly confused, and cooperative with exam.  In no apparent distress. Integument:  Pink, warm, dry. No visible rashes or open areas. Healing ecchymotic area left forehead. HEENT:  Normocephalic. External ears appear to be within normal limits. [place]. Conjunctivae pink. Sclerae nonicteric. Mucous membranes pink, moist.  No oropharyngeal exudate. Neck:  Supple. No cervical or clavicular lymphadenopathy. No masses noted. Cardiovascular:  Heart rate regular rate and rhythm. No murmurs, gallops, rubs noted. Respiratory:  Lung sounds Normal.  Respirations nonlabored. The patient is not using accessory muscles with breathing. Current spo2 98% on room air. Abdomen:  Soft, nontender, nondistended, normoactive bowel sounds. No masses noted. Musculoskeletal: No muscle tenderness. No joint tenderness. PV:  Peripheral pulses present. She  does not have any edema to BLE. Psychological: Mood stable. Affect pleasant. Speech normal rate and tone. ASSESSMENT AND PLAN:      Diagnosis Orders   1. Meningioma ([facility] [address]        2. Altered mental status, unspecified altered mental status type        3. Seizures ([address]        4. Hypotension, unspecified hypotension type        5. Vascular dementia without behavioral disturbance ([address]        6. Chronic kidney disease, unspecified CKD stage        7. Uncomplicated asthma, unspecified asthma severity, unspecified whether persistent             Patient is now on hospice services. Patient has no current symptoms. Patient remains at her baseline mental status. She is now dependent on nursing staff to help with her ADL's.  present and supportive to patients needs. No seizure activity since readmission to facility. Continue medication regimen as ordered. Blood pressure stable. Mood stable. Patient has not had any further decrease in her cognition or increase in her behaviors since her admission to healthcare. Most recent BUN 17, creatinine 1.20. Respiratory status remains stable. Patient has not had any asthma attacks since her readmission to this facility.     I have reviewed this resident's medication and treatment plan as well as most recent hospital records. We will continue to work with hospice to meet the patient's overall comfort, function and safety needs. No further changes will be made at this time. Return in about 1 month (around 2/10/2023), or if symptoms worsen or fail to improve, for chronic conditions. Please note this report is partially produced by using speech recognition hardware. It may contain errors related to the system, including grammar, punctuation and spelling as well as words and phrases that may seem inaccurate. For any questions or concerns feel free to contact me for clarification        Electronically Signed By: Jonah Molina. Madhuri Danielle CNP on 1/29/23   ______________________________  Jonah Molina.  Andi MUELLER CNP